# Patient Record
Sex: MALE | Race: WHITE | NOT HISPANIC OR LATINO | Employment: FULL TIME | URBAN - METROPOLITAN AREA
[De-identification: names, ages, dates, MRNs, and addresses within clinical notes are randomized per-mention and may not be internally consistent; named-entity substitution may affect disease eponyms.]

---

## 2018-07-23 ENCOUNTER — OFFICE VISIT (OUTPATIENT)
Dept: URGENT CARE | Facility: CLINIC | Age: 28
End: 2018-07-23
Payer: COMMERCIAL

## 2018-07-23 VITALS
HEIGHT: 67 IN | BODY MASS INDEX: 37.67 KG/M2 | SYSTOLIC BLOOD PRESSURE: 117 MMHG | HEART RATE: 79 BPM | OXYGEN SATURATION: 97 % | DIASTOLIC BLOOD PRESSURE: 67 MMHG | WEIGHT: 240 LBS | RESPIRATION RATE: 18 BRPM | TEMPERATURE: 98 F

## 2018-07-23 DIAGNOSIS — R11.2 NAUSEA AND VOMITING, INTRACTABILITY OF VOMITING NOT SPECIFIED, UNSPECIFIED VOMITING TYPE: ICD-10-CM

## 2018-07-23 DIAGNOSIS — R19.7 DIARRHEA, UNSPECIFIED TYPE: ICD-10-CM

## 2018-07-23 DIAGNOSIS — K52.9 ACUTE GASTROENTERITIS: Primary | ICD-10-CM

## 2018-07-23 PROCEDURE — 99213 OFFICE O/P EST LOW 20 MIN: CPT | Mod: S$GLB,,, | Performed by: NURSE PRACTITIONER

## 2018-07-23 NOTE — PATIENT INSTRUCTIONS
Counseled pt on drinking a sip every 15 min with gradual increases as tolerated to fight the dehydration. If unable to keep fluids down with rx'd meds, will have to go to ER for IVF. Once tolerating orals, nstructed on bland diet, foods that constipate like cheese, milk, high fiber foods to slow diarrhea. If having more than 8 bouts of diarrhea a day, than ok for imodium. Rather do this later on in virus course rather than early. Need virus to get out.

## 2018-07-23 NOTE — LETTER
July 23, 2018      [facility]  [place]  [address] [facility] [place] [address]  Phone: [phone]  Fax: [phone]       Patient: Aj Smalls   YOB: 1990  Date of Visit: 07/23/2018    To Whom It May Concern:    Britt Smalls  was at [facility] on 07/23/2018. He may return to work/school on 7/24/18 with no restrictions. If you have any questions or concerns, or if I can be of further assistance, please do not hesitate to contact me.    Sincerely,            Latoya Penaloza NP

## 2018-07-23 NOTE — PROGRESS NOTES
"Subjective:       Patient ID: Aj Smalls is a 28 y.o. male.    Vitals:  height is 5' 7" (1.702 m) and weight is 108.9 kg (240 lb). His oral temperature is 98.2 °F (36.8 °C). His blood pressure is 117/67 and his pulse is 79. His respiration is 18 and oxygen saturation is 97%.     Chief Complaint: Emesis    27 y/o male presents with nausea starting last night followed by vomiting x 3 overnight. Last vomiting at 12 last night. Started with diarrhea. Has been having diarrhea this am. Drank water this am.       Emesis    This is a new problem. The current episode started yesterday. The problem occurs 2 to 4 times per day. The problem has been unchanged. The emesis has an appearance of stomach contents. There has been no fever. Associated symptoms include diarrhea. Pertinent negatives include no abdominal pain, chest pain, chills, coughing, fever, headaches or myalgias. He has tried increased fluids for the symptoms. The treatment provided no relief.     Review of Systems   Constitution: Negative for chills, diaphoresis, fever, weakness and malaise/fatigue.   HENT: Negative for congestion, ear pain, hoarse voice and sore throat.    Eyes: Negative for blurred vision, discharge and redness.   Cardiovascular: Negative for chest pain, dyspnea on exertion and leg swelling.   Respiratory: Negative for cough, shortness of breath and sputum production.    Musculoskeletal: Negative for back pain, joint pain and myalgias.   Gastrointestinal: Positive for diarrhea and vomiting. Negative for abdominal pain, constipation and nausea.   Neurological: Negative for headaches.   Psychiatric/Behavioral: The patient is not nervous/anxious.        Objective:      Physical Exam   Constitutional: He is oriented to person, place, and time. He appears well-developed and well-nourished.   HENT:   Head: Normocephalic and atraumatic.   Right Ear: External ear normal.   Left Ear: External ear normal.   Nose: Nose normal.   Mouth/Throat: " Oropharynx is clear and moist. No oropharyngeal exudate.   Cardiovascular: Normal rate, regular rhythm and normal heart sounds.    Pulmonary/Chest: Effort normal and breath sounds normal.   Abdominal: Soft. Bowel sounds are normal. There is no tenderness.   Musculoskeletal: He exhibits no edema.   Neurological: He is alert and oriented to person, place, and time.   Skin: Skin is warm and dry. No pallor.   Psychiatric: He has a normal mood and affect. His behavior is normal. Judgment and thought content normal.   Nursing note and vitals reviewed.      Assessment:       1. Acute gastroenteritis    2. Nausea and vomiting, intractability of vomiting not specified, unspecified vomiting type    3. Diarrhea, unspecified type        Plan:       1. Acute gastroenteritis  Counseled pt on drinking a sip every 15 min with gradual increases as tolerated to fight the dehydration. If unable to keep fluids down with rx'd meds, will have to go to ER for IVF. Once tolerating orals, nstructed on bland diet, foods that constipate like cheese, milk, high fiber foods to slow diarrhea. If having more than 8 bouts of diarrhea a day, than ok for imodium. Rather do this later on in virus course rather than early. Need virus to get out.     2. Nausea and vomiting, intractability of vomiting not specified, unspecified vomiting type  No more nausea    3. Diarrhea, unspecified type  Advised to not take anymore imodium.

## 2018-07-27 ENCOUNTER — TELEPHONE (OUTPATIENT)
Dept: URGENT CARE | Facility: CLINIC | Age: 28
End: 2018-07-27

## 2020-12-01 ENCOUNTER — OUTSIDE PLACE OF SERVICE (OUTPATIENT)
Dept: CARDIOLOGY | Facility: CLINIC | Age: 30
End: 2020-12-01
Payer: MEDICAID

## 2020-12-01 PROCEDURE — 93010 ELECTROCARDIOGRAM REPORT: CPT | Mod: ,,, | Performed by: INTERNAL MEDICINE

## 2020-12-01 PROCEDURE — 93010 PR ELECTROCARDIOGRAM REPORT: ICD-10-PCS | Mod: ,,, | Performed by: INTERNAL MEDICINE
